# Patient Record
(demographics unavailable — no encounter records)

---

## 2024-10-29 NOTE — PLAN
[TextEntry] : - Thyroid nodule on recent CT neck. No associated symptoms but patient feels like it has been growing. - US in the office today showed a complex cyst with a solid component. 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 Recommended DL and biopsy of the VC and will request a USFNA of the thyroid after the VC biopsy. - Risks and complications of the procedure discussed with the patient today.

## 2024-10-29 NOTE — HISTORY OF PRESENT ILLNESS
[de-identified] : This is a 65 yro patient with HTN, DM, COPD, bronchitis, referred by pulmonologist Dr. Jonh Lowery for thyroid nodule.  This was found about 6 years ago during physical exam/US. Per pt, she had about 3 negative biopsies many yrs ago ([id number]) with an ENT, she does not remember the name.  CT chest done for lung cancer screening 11/2023 showed a 2.6cm R thyroid nodule.  No recent US thyroid.  No dysphagia, odynophonia or pressure in the throat.  Pt with raspy voice which comes and goes but getting worse in the last 5 years.   Patient denies h/o radiation and has no family h/o thyroid cancer.  Not on any thyroid medications. Smoked 1ppd for 45 yrs. Has cut back to currently 5-7cig/day. Pt will be scheduling new CT chest in Nov 2024.    CT chest w/o contrast 11/20/2023 IMPRESSION: Interval resolution of bilateral nodular opacities within the upper lungs. Emphysema. Recommend annual screening with low-dose chest CT in 12 months if the patient continues to meet eligibility criteria. 2.6 cm right thyroid nodule. Recommend further evaluation with ultrasound if not previously performed.

## 2024-10-29 NOTE — HISTORY OF PRESENT ILLNESS
[de-identified] : This is a 65 yro patient with HTN, DM, COPD, bronchitis, referred by pulmonologist Dr. Jonh Lowery for thyroid nodule.  This was found about 6 years ago during physical exam/US. Per pt, she had about 3 negative biopsies many yrs ago ([id number]) with an ENT, she does not remember the name.  CT chest done for lung cancer screening 11/2023 showed a 2.6cm R thyroid nodule.  No recent US thyroid.  No dysphagia, odynophonia or pressure in the throat.  Pt with raspy voice which comes and goes but getting worse in the last 5 years.   Patient denies h/o radiation and has no family h/o thyroid cancer.  Not on any thyroid medications. Smoked 1ppd for 45 yrs. Has cut back to currently 5-7cig/day. Pt will be scheduling new CT chest in Nov 2024.    CT chest w/o contrast 11/20/2023 IMPRESSION: Interval resolution of bilateral nodular opacities within the upper lungs. Emphysema. Recommend annual screening with low-dose chest CT in 12 months if the patient continues to meet eligibility criteria. 2.6 cm right thyroid nodule. Recommend further evaluation with ultrasound if not previously performed.

## 2024-11-27 NOTE — HISTORY OF PRESENT ILLNESS
[Current] : Current [TextBox_13] : Patient is scheduled for a annual  LDCT for lung cancer screening.  Chart review performed to confirm eligibility for LDCT.    No documented personal or family history of lung cancer. No documented s/s of lung cancer. Patient is a current smoker with a 40 pack year hx. [PacksperYear] : 40

## 2024-12-12 NOTE — HISTORY OF PRESENT ILLNESS
[de-identified] : This is a 65 yro patient with HTN, DM, COPD, bronchitis, was referred by pulmonologist Dr. 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  Per pt, she had about 3 negative thyroid biopsies many yrs ago ([id number]) with an ENT, she does not remember the name. CT chest done for lung cancer screening 11/2023 showed a 2.6cm R thyroid nodule.  Pt had raspy voice which comes and goes but was getting worse in the last 5 years.   Here today for follow-up s/p Direct laryngoscopy with use of telescope and biopsy of the left vocal cord lesion on 11/11/2024. Today pt reports voice is raspier than it was before procedure. States she feels lump in R neck/thyroid is larger. She feels something in R side of throat when swallowing.  No dysphagia, odynophonia, completes loss of voice, hemoptysis, fever, chills, or weight loss. Eating and drinking without issues.  Pt still smoking about 5-6 cigarettes/ day.   Left vocal cord biopsy on 11/11/24: Final Diagnosis 1. Larynx, left vocal cord lesion, biopsy - Polypoid laryngeal tissue with squamous hyperplasia, focal papillary hyperplasia, hyperkeratosis, and focal low-grade dysplasia  
[de-identified] : This is a 65 yro patient with HTN, DM, COPD, bronchitis, was referred by pulmonologist Dr. 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  Per pt, she had about 3 negative thyroid biopsies many yrs ago ([id number]) with an ENT, she does not remember the name. CT chest done for lung cancer screening 11/2023 showed a 2.6cm R thyroid nodule.  Pt had raspy voice which comes and goes but was getting worse in the last 5 years.   Here today for follow-up s/p Direct laryngoscopy with use of telescope and biopsy of the left vocal cord lesion on 11/11/2024. Today pt reports voice is raspier than it was before procedure. States she feels lump in R neck/thyroid is larger. She feels something in R side of throat when swallowing.  No dysphagia, odynophonia, completes loss of voice, hemoptysis, fever, chills, or weight loss. Eating and drinking without issues.  Pt still smoking about 5-6 cigarettes/ day.   Left vocal cord biopsy on 11/11/24: Final Diagnosis 1. Larynx, left vocal cord lesion, biopsy - Polypoid laryngeal tissue with squamous hyperplasia, focal papillary hyperplasia, hyperkeratosis, and focal low-grade dysplasia  
Reviewed results with patient.  Patient aware to follow up with OB/GYN on Monday as scheduled. Patient states she is feeling much better after IVF and antiemetic. Return precautions given. Patient agreeable to discharge.

## 2024-12-12 NOTE — PLAN
[TextEntry] : - Thyroid nodule on recent CT neck. No associated symptoms but patient feels like it has been growing. - US in the office today showed a complex cyst with a solid component. 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 Recommended DL and biopsy of the VC and will request a USFNA of the thyroid after the VC biopsy. - Procedure done on 11/11/24. Result was benign. Results discussed with the patient today. - No lesions on scope today. - Return in 6 months.

## 2024-12-12 NOTE — REASON FOR VISIT
[Subsequent Evaluation] : a subsequent evaluation for [Post-Operative Visit] : a post-operative visit [FreeTextEntry2] : s/p Direct laryngoscopy with use of telescope and biopsy of the left vocal cord lesion on 11/11/2024

## 2025-01-16 NOTE — HISTORY OF PRESENT ILLNESS
[Current] : current [>= 20 pack years] : >= 20 pack years [Lung Cancer Screening] : Patient underwent lung cancer screening [0] : 0 [2] : 2 [TextBox_4] : ~age~yo woman current >40py smoker w/ PMH COPD on nocturnal O2 w/ frequent exacerbations presenting to re-establish pulmonary follow-up.  Previous pt of Dr. Steel (last seen 8/2024. Last hospitalization for AECOPD 12/2023. Doing very well in the interim. Did not like Trelegy and didn't feel it covered her symptoms for the full 24hrs -- her PMD switched her to Incruse + Dulera which has been working better for her in maintaining symptom relief throughout the day. Also tolerating Daliresp 500mcg daily. Doesn't need ventolin very often. Using O2 at night consistently; not needing during daytime. Denies wheezing but has been coughing a bit more recently that she attributes to the seasonal change with allergies. Says she is due for annual LDCT lung CA screening. No interval ED/UC visits for COPD nor need for oral steroid.   SH: Current smoker, >40py; now 5-6cig per day - not ready to quit  [1/16/25] Following up and post-discharge from [facility] hospitalization on 1/3-1/8/25 for AECOPD suspected 2/2 viral URI. Between last visit had also switched back from triple therapy w/ Breztri to Symbicort + Incruse. Saw ENT Dr. Encinas for eval for thyroid nodule and also workup for dysphonia w/ laryngoscopy and VC bx latter of which was benign. Today she is feeling much better from the hospital. Still completing slow prednisone taper, on 20mg about to go down to 10mg to finish this weekend. Still has bothersome PND with throat clearing; using mucinex still and got refill from her PMD yesterday. No wheezing or tightness since hospital discharge and thinks she's back to her baseline level of FINNEY. Cough also better. Using LABA/ICS+LAMA as directed. Needs albuterol nebs refilled.  [TextBox_11] : 1 [TextBox_13] : >40 [TextBox_12] : 04/2022 [TextBox_27] : 04/2023 [TextBox_42] : 12/2024 [TextBox_52] : 5

## 2025-01-16 NOTE — REVIEW OF SYSTEMS
[SOB on Exertion] : sob on exertion [Negative] : Endocrine [Postnasal Drip] : postnasal drip [Cough] : no cough [Sputum] : no sputum [Wheezing] : no wheezing

## 2025-01-16 NOTE — PHYSICAL EXAM
[No Acute Distress] : no acute distress [Normal Oropharynx] : normal oropharynx [Normal Appearance] : normal appearance [Normal Rate/Rhythm] : normal rate/rhythm [Normal S1, S2] : normal s1, s2 [No Resp Distress] : no resp distress [Normal Rhythm and Effort] : normal rhythm and effort [No Abnormalities] : no abnormalities [Normal Gait] : normal gait [No Clubbing] : no clubbing [No Edema] : no edema [Normal Color/ Pigmentation] : normal color/ pigmentation [No Focal Deficits] : no focal deficits [Oriented x3] : oriented x3 [Normal Affect] : normal affect [TextBox_68] : single late and soft end-exp wheeze right upper posterior field; remaining fields CTA though diminished overall throughout [Clear to Auscultation Bilaterally] : clear to auscultation bilaterally [TextBox_44] : right neck palpable nodule, consistent with known ?thyroid nodule

## 2025-01-16 NOTE — DISCUSSION/SUMMARY
[FreeTextEntry1] : CT Chest w/o (PACS, 12/2023) - resolved bilateral nodular opacities in upper lobers, emphysema  PFT (9/2023) - mod/severe restr, decr DLCO; poor quality study

## 2025-01-16 NOTE — HISTORY OF PRESENT ILLNESS
[Current] : current [>= 20 pack years] : >= 20 pack years [Lung Cancer Screening] : Patient underwent lung cancer screening [0] : 0 [2] : 2 [TextBox_4] : ~age~yo woman current >40py smoker w/ PMH COPD on nocturnal O2 w/ frequent exacerbations presenting to re-establish pulmonary follow-up.  Previous pt of Dr. Steel (last seen 8/2024. Last hospitalization for AECOPD 12/2023. Doing very well in the interim. Did not like Trelegy and didn't feel it covered her symptoms for the full 24hrs -- her PMD switched her to Incruse + Dulera which has been working better for her in maintaining symptom relief throughout the day. Also tolerating Daliresp 500mcg daily. Doesn't need ventolin very often. Using O2 at night consistently; not needing during daytime. Denies wheezing but has been coughing a bit more recently that she attributes to the seasonal change with allergies. Says she is due for annual LDCT lung CA screening. No interval ED/UC visits for COPD nor need for oral steroid.   SH: Current smoker, >40py; now 5-6cig per day - not ready to quit  [1/16/25] Following up and post-discharge from [facility] hospitalization on 1/3-1/8/25 for AECOPD suspected 2/2 viral URI. Between last visit had also switched back from triple therapy w/ Breztri to Symbicort + Incruse. Saw ENT Dr. Encinas for eval for thyroid nodule and also workup for dysphonia w/ laryngoscopy and VC bx latter of which was benign. Today she is feeling much better from the hospital. Still completing slow prednisone taper, on 20mg about to go down to 10mg to finish this weekend. Still has bothersome PND with throat clearing; using mucinex still and got refill from her PMD yesterday. No wheezing or tightness since hospital discharge and thinks she's back to her baseline level of FINNEY. Cough also better. Using LABA/ICS+LAMA as directed. Needs albuterol nebs refilled.  [TextBox_11] : 1 [TextBox_13] : >40 [TextBox_12] : 04/2022 [TextBox_27] : 04/2023 [TextBox_42] : 12/2024 [TextBox_52] : 5

## 2025-01-16 NOTE — ASSESSMENT
[FreeTextEntry1] : ~age~yo woman current >40py smoker w/ PMH asthma/COPD overlap on nocturnal O2 w/ frequent exacerbations following up for COPD management; last exacerbation 1/2025.  Data Reviewed: LDCT Chest (PACS, 12/2/24) - LUNG-RADS 2, no new nodules; unchanged RLL perifissural 0.5cm nodule and 0.4cm nodule; emphysema PFT (10/2024) - poor quality study ?moderate/severe obstruction; mod-severe DLCO; could not adequately measure pleth for volumes 6MWT (10/2024) - 60m (terminated after 3min said she was too tired to continue), pre HR/SpO2 95/92%, post 110/89%  Impression: #COPD, GOLD E, last exacerbation 1/3/25 #Chronic hypoxemic respiratory failure #Pulmonary nodules #Current smoker - not ready to quit but willing to try cutting down  Plan: - cont albuterol MDI 1-2 puffs vs. nebulizer q4-6h PRN; rx renewed - cont Symbicort 160/4.5 2 puffs BID with mouth rinsing - cont Incruse 1 puff daily; had tried to consolidate to Trelegy (not covered) and did not feel Breztri was as effective - cont Daliresp 500mcg daily; rx renewed - will try and get ins approval to start PDE3/4i Ohtuvayre nebs BID; discussed new/novel med at length as add-on to mod-severe COPD pts; also reviewed appropriate usage and safety profile/common ADRs  - UTD on PFT/6MWT as above; poor overall quality study however - UTD on LDCT as above, LUNG-RADS 2 nodules - can cont nocturnal O2 for now - needs to stop smoking, have discussed several times - she is not yet ready to quit - also briefly discussed Dupixent as possible add-on therapy but will try Ohtuvayre first - pt has declined PSG testing for STARLA and pulm rehab   RTO 3mos, or sooner if needed

## 2025-03-25 NOTE — SURGICAL HISTORY
Please inform ct is showing no significant parathyroid abnormalities seen, incidental note of thyroid nodule present which is consistent with finding on us thyroid keep follow up as planned to further review and discuss    Study Result     PROCEDURE:  CT S parenchymal scarring in the lung apices with ground-glass reticular infiltrate left upper lobe anteriorly likely related to post radiation changes. CONCLUSION:  1.3 cm nodule arising from the inferior pole of the right lobe of the thyroid gland.     Ther [PSH Reviewed and Updated] : past surgical history reviewed and updated

## 2025-03-26 NOTE — REASON FOR VISIT
[Initial Evaluation] : an initial evaluation [FreeTextEntry1] : >40py smoker w/ PMH asthma/COPD overlap on nocturnal O2 w/ frequent exacerbations following up for COPD management; last exacerbation 1/2025.

## 2025-03-26 NOTE — HISTORY OF PRESENT ILLNESS
[Family Member] : family member [Formal Caregiver] : formal caregiver [[facility] Calls Co-Management Patient] : [unfilled] is a [facility] Calls co-management patient [Education provided] : education provided [LastPCPVisitDate] : 3/19/25 [FreeTextEntry1] : >40py smoker w/ PMH asthma/COPD overlap on nocturnal O2 w/ frequent exacerbations following up for COPD management; last exacerbation 1/2025. [FreeTextEntry2] :  GLORIA JOHNSTON is being seen for a visit provided via telehealth real-time audio visual technology.  GLORIA JOHNSTON was located in their home, [address] [place] [address], at the time of the visit.  The house calls clinician, RAMU RIVERO, was located remotely at their home in [place] at the time of the visit.  The patient, GLORIA JOHNSTON and the house calls clinician RAMU RIVERO, participated in the telehealth encounter. other participants included:  GLORIA JOHNSTON (Jul 11 1959) or his/her representative consents to the use of telehealth.  All questions related to telehealth answered.  64 yo woman current >40py smoker w/ PMH asthma/COPD overlap on nocturnal O2 w/ frequent exacerbations following up for COPD management; last exacerbation 1/2025.  Data Reviewed: LDCT Chest (PACS, 12/2/24) - LUNG-RADS 2, no new nodules; unchanged RLL perifissural 0.5cm nodule and 0.4cm nodule; emphysema PFT (10/2024) - poor quality study ?moderate/severe obstruction; mod-severe DLCO; could not adequately measure pleth for volumes 6MWT (10/2024) - 60m (terminated after 3min said she was too tired to continue), pre HR/SpO2 95/92%, post 110/89%  Patient/ patient's caregiver reports no weight loss >10lbs in the past 6 months. No changes in dentition or swallowing reported, No changes in hearing or vision reported. No changes in Cognition reported. Patient denies any symptoms of depression or anxiety. Patient is ADL independent/dependent and IADL dependent. No changes in gait or falls reported.  Patient's home environment is safe.  .

## 2025-03-26 NOTE — CHRONIC CARE ASSESSMENT
[Limited decision making ability] : limited decision making ability [PPS Score: ____] : Palliative Performance Scale (PPS) Score: [unfilled] [de-identified] : as tolerated [de-identified] : low sodium

## 2025-03-26 NOTE — COUNSELING
[Normal Weight - ( BMI  <25 )] : normal weight - ( BMI  <25 ) [Hypertension self management education material provided] : hypertension self management education material provided [Non - Smoker] : non-smoker [Use assistive device to avoid falls] : use assistive device to avoid falls [] : foot exam [Patient not on disease-modifying anti-rheumatic drug due to overall prognosis] : Patient not on disease-modifying anti-rheumatic drug due to overall prognosis [Not Recommended] : Aspirin use not recommended due to overall prognosis [Decrease hospital use] : decrease hospital use [FreeTextEntry4] : Educated patient/legal representative about CCM services and consent. Educated patient/legal representative that this service is available because they have 2 or more chronic conditions, that only one Provider can furnish CCM Services per calendar month and that CCM services are subject to the usual Medicare deductible and coinsurance.  Patient/legal representative understands the right to stop the CCM services at any time by notifying [facility] Calls office. Patient/legal representative has verbally consented (3/2025)  [ - New patient with 2 or more chronic conditions; CCM discussed and patient-centered care plan established] : New patient with 2 or more chronic conditions; CCM discussed and patient-centered care plan established

## 2025-03-26 NOTE — HISTORY OF PRESENT ILLNESS
[Family Member] : family member [Formal Caregiver] : formal caregiver [House Calls Co-Management Patient] : [unfilled] is a House Calls co-management patient [Education provided] : education provided [LastPCPVisitDate] : 3/19/25 [FreeTextEntry1] : >40py smoker w/ PMH asthma/COPD overlap on nocturnal O2 w/ frequent exacerbations following up for COPD management; last exacerbation 1/2025. [FreeTextEntry2] :  GLORIA JOHNSTON is being seen for a visit provided via telehealth real-time audio visual technology.  GLORIA JOHNSTON was located in their home, [address] [place] [address], at the time of the visit.  The house calls clinician, RAMU RIVERO, was located remotely at their home in [place] at the time of the visit.  The patient, GLORIA JOHNSTON and the house calls clinician RAMU RIVERO, participated in the telehealth encounter. other participants included:  GLORIA JOHNSTON (Jul 11 1959) or his/her representative consents to the use of telehealth.  All questions related to telehealth answered.  64 yo woman current >40py smoker w/ PMH asthma/COPD overlap on nocturnal O2 w/ frequent exacerbations following up for COPD management; last exacerbation 1/2025.  Data Reviewed: LDCT Chest (PACS, 12/2/24) - LUNG-RADS 2, no new nodules; unchanged RLL perifissural 0.5cm nodule and 0.4cm nodule; emphysema PFT (10/2024) - poor quality study ?moderate/severe obstruction; mod-severe DLCO; could not adequately measure pleth for volumes 6MWT (10/2024) - 60m (terminated after 3min said she was too tired to continue), pre HR/SpO2 95/92%, post 110/89%  Patient/ patient's caregiver reports no weight loss >10lbs in the past 6 months. No changes in dentition or swallowing reported, No changes in hearing or vision reported. No changes in Cognition reported. Patient denies any symptoms of depression or anxiety. Patient is ADL independent/dependent and IADL dependent. No changes in gait or falls reported.  Patient's home environment is safe.  .

## 2025-03-26 NOTE — CHRONIC CARE ASSESSMENT
[Limited decision making ability] : limited decision making ability [PPS Score: ____] : Palliative Performance Scale (PPS) Score: [unfilled] [de-identified] : as tolerated [de-identified] : low sodium

## 2025-04-15 NOTE — PHYSICAL EXAM
[No Acute Distress] : no acute distress [Normal Oropharynx] : normal oropharynx [Normal Appearance] : normal appearance [Normal Rate/Rhythm] : normal rate/rhythm [Normal S1, S2] : normal s1, s2 [No Resp Distress] : no resp distress [Normal Rhythm and Effort] : normal rhythm and effort [Clear to Auscultation Bilaterally] : clear to auscultation bilaterally [No Abnormalities] : no abnormalities [Normal Gait] : normal gait [No Clubbing] : no clubbing [No Edema] : no edema [Normal Color/ Pigmentation] : normal color/ pigmentation [No Focal Deficits] : no focal deficits [Oriented x3] : oriented x3 [Normal Affect] : normal affect [TextBox_44] : right neck palpable nodule, consistent with known ?thyroid nodule

## 2025-04-17 NOTE — DISCUSSION/SUMMARY
[FreeTextEntry1] : LDCT Chest (PACS, 12/2/24) - LUNG-RADS 2, no new nodules; unchanged RLL perifissural 0.5cm nodule and 0.4cm nodule; emphysema  PFT (10/2024) - poor quality study ?moderate/severe obstruction; mod-severe DLCO; could not adequately measure pleth for volumes  6MWT (10/2024) - 60m (terminated after 3min said she was too tired to continue), pre HR/SpO2 95/92%, post 110/89%  CT Chest w/o (PACS, 12/2023) - resolved bilateral nodular opacities in upper lobers, emphysema  PFT (9/2023) - mod/severe restr, decr DLCO; poor quality study

## 2025-04-17 NOTE — ASSESSMENT
[FreeTextEntry1] : ~age~yo woman current >40py smoker w/ PMH asthma/COPD overlap on nocturnal O2 w/ frequent exacerbations following up for COPD management; last exacerbation 1/2025.  Data Reviewed: Medication reconciliation  Impression: #COPD, GOLD E, last exacerbation 1/3/25 #Chronic hypoxemic respiratory failure #Pulmonary nodules - LUNG-RADS 2, in annual LDCT program  #Current smoker - not ready to quit but willing to try cutting down; still at 5-7cig/day  Plan: - cont albuterol MDI 1-2 puffs vs. nebulizer q4-6h PRN; - cont Symbicort 160/4.5 2 puffs BID with mouth rinsing; rx renewed - cont Incruse 1 puff daily; had tried to consolidate to Trelegy (not covered) and did not feel Breztri was as effective; rx renewed for Incruse - cont Daliresp 500mcg daily; rx renewed - pt did not yet start Ohtuvayre due to confusion with her nebulizer machine vs. accepting new one from pharmacy; instructed her to contact [facility] for delivery of nebulizer system to use Ohtuvayre - UTD on PFT/6MWT albeit poor overall quality study - UTD on LDCT LUNG-RADS 2 nodules - can cont nocturnal O2 for now - needs to stop smoking, have discussed several times and again counseled her today - she is not yet ready to quit - had discussed Dupixent as possible add-on therapy last visit and intended to try Ohtuvayre first -- since she has not yet started the Ohtuvayre yet will defer Dupixent consideration until next f/u pending progress - pt has declined PSG testing for STARLA and pulm rehab; again re-broached this today and still declines  RTO 3mos, or sooner if needed

## 2025-04-17 NOTE — ASSESSMENT
[FreeTextEntry1] : ~age~yo woman current >40py smoker w/ PMH asthma/COPD overlap on nocturnal O2 w/ frequent exacerbations following up for COPD management; last exacerbation 1/2025.  Data Reviewed: Medication reconciliation  Impression: #COPD, GOLD E, last exacerbation 1/3/25 #Chronic hypoxemic respiratory failure #Pulmonary nodules - LUNG-RADS 2, in annual LDCT program  #Current smoker - not ready to quit but willing to try cutting down; still at 5-7cig/day  Plan: - cont albuterol MDI 1-2 puffs vs. nebulizer q4-6h PRN; - cont Symbicort 160/4.5 2 puffs BID with mouth rinsing; rx renewed - cont Incruse 1 puff daily; had tried to consolidate to Trelegy (not covered) and did not feel Breztri was as effective; rx renewed for Incruse - cont Daliresp 500mcg daily; rx renewed - pt did not yet start Ohtuvayre due to confusion with her nebulizer machine vs. accepting new one from pharmacy; instructed her to contact [facility] for delivery of nebulizer system to use Ohtuvayre - UTD on PFT/6MWT albeit poor overall quality study - UTD on LDCT LUNG-RADS 2 nodules - can cont nocturnal O2 for now - needs to stop smoking, have discussed several times and again counseled her today - she is not yet ready to quit - had discussed Dupixent as possible add-on therapy last visit and intended to try Ohtuvayre first -- since she has not yet started the Ohtuvayre yet will defer Dupixent consideration until next f/u pending progress - pt has declined PSG testing for STARLA and pulm rehab; again re-broached this today and still declines  RTO 3mos, or sooner if needed

## 2025-04-17 NOTE — HISTORY OF PRESENT ILLNESS
[Current] : current [>= 20 pack years] : >= 20 pack years [Lung Cancer Screening] : Patient underwent lung cancer screening [0] : 0 [2] : 2 [TextBox_4] : ~age~yo woman current >40py smoker w/ PMH COPD on nocturnal O2 w/ frequent exacerbations presenting to re-establish pulmonary follow-up.  Previous pt of Dr. Steel (last seen 8/2024. Last hospitalization for AECOPD 12/2023. Doing very well in the interim. Did not like Trelegy and didn't feel it covered her symptoms for the full 24hrs -- her PMD switched her to Incruse + Dulera which has been working better for her in maintaining symptom relief throughout the day. Also tolerating Daliresp 500mcg daily. Doesn't need ventolin very often. Using O2 at night consistently; not needing during daytime. Denies wheezing but has been coughing a bit more recently that she attributes to the seasonal change with allergies. Says she is due for annual LDCT lung CA screening. No interval ED/UC visits for COPD nor need for oral steroid.   SH: Current smoker, >40py; now 5-6cig per day - not ready to quit  [1/16/25] Following up and post-discharge from [facility] hospitalization on 1/3-1/8/25 for AECOPD suspected 2/2 viral URI. Between last visit had also switched back from triple therapy w/ Breztri to Symbicort + Incruse. Saw ENT Dr. Encinas for eval for thyroid nodule and also workup for dysphonia w/ laryngoscopy and VC bx latter of which was benign. Today she is feeling much better from the hospital. Still completing slow prednisone taper, on 20mg about to go down to 10mg to finish this weekend. Still has bothersome PND with throat clearing; using mucinex still and got refill from her PMD yesterday. No wheezing or tightness since hospital discharge and thinks she's back to her baseline level of FINNEY. Cough also better. Using LABA/ICS+LAMA as directed. Needs albuterol nebs refilled.   [4/17/25] 3mo f/u. Still smoking, about 5-7 cigarettes a day. Not ready to quit. Did not start the Ohtuvayre yet because she wasn't sure if she could use it with her current nebulizer and was confused when the pharmacy told her she needed a nebulizer with a filter. Did not have a new nebulizer delivered. No specific new or worsening resp symptoms. Using inhalers and daliresp as directed; needs refills on some. No interval ED/UC visits for COPD. No new or worsening cough, no change in FINNEY. Using O2 at night unchanged. Not interested in sleep eval.  [TextBox_11] : 1 [TextBox_13] : >40 [TextBox_12] : 04/2022 [TextBox_27] : 04/2023 [TextBox_42] : 12/2024 [TextBox_52] : 5

## 2025-04-17 NOTE — HISTORY OF PRESENT ILLNESS
[Current] : current [>= 20 pack years] : >= 20 pack years [Lung Cancer Screening] : Patient underwent lung cancer screening [0] : 0 [2] : 2 [TextBox_4] : ~age~yo woman current >40py smoker w/ PMH COPD on nocturnal O2 w/ frequent exacerbations presenting to re-establish pulmonary follow-up.  Previous pt of Dr. Steel (last seen 8/2024. Last hospitalization for AECOPD 12/2023. Doing very well in the interim. Did not like Trelegy and didn't feel it covered her symptoms for the full 24hrs -- her PMD switched her to Incruse + Dulera which has been working better for her in maintaining symptom relief throughout the day. Also tolerating Daliresp 500mcg daily. Doesn't need ventolin very often. Using O2 at night consistently; not needing during daytime. Denies wheezing but has been coughing a bit more recently that she attributes to the seasonal change with allergies. Says she is due for annual LDCT lung CA screening. No interval ED/UC visits for COPD nor need for oral steroid.   SH: Current smoker, >40py; now 5-6cig per day - not ready to quit  [1/16/25] Following up and post-discharge from [facility] hospitalization on 1/3-1/8/25 for AECOPD suspected 2/2 viral URI. Between last visit had also switched back from triple therapy w/ Breztri to Symbicort + Incruse. Saw ENT Dr. Encinas for eval for thyroid nodule and also workup for dysphonia w/ laryngoscopy and VC bx latter of which was benign. Today she is feeling much better from the hospital. Still completing slow prednisone taper, on 20mg about to go down to 10mg to finish this weekend. Still has bothersome PND with throat clearing; using mucinex still and got refill from her PMD yesterday. No wheezing or tightness since hospital discharge and thinks she's back to her baseline level of FINNEY. Cough also better. Using LABA/ICS+LAMA as directed. Needs albuterol nebs refilled.   [4/17/25] 3mo f/u. Still smoking, about 5-7 cigarettes a day. Not ready to quit. Did not start the Ohtuvayre yet because she wasn't sure if she could use it with her current nebulizer and was confused when the pharmacy told her she needed a nebulizer with a filter. Did not have a new nebulizer delivered. No specific new or worsening resp symptoms. Using inhalers and daliresp as directed; needs refills on some. No interval ED/UC visits for COPD. No new or worsening cough, no change in FINNEY. Using O2 at night unchanged. Not interested in sleep eval.  [TextBox_11] : 1 [TextBox_13] : >40 [TextBox_12] : 04/2022 [TextBox_27] : 04/2023 [TextBox_42] : 12/2024 [TextBox_52] : 5

## 2025-04-17 NOTE — REVIEW OF SYSTEMS
[Postnasal Drip] : postnasal drip [SOB on Exertion] : sob on exertion [Negative] : Endocrine [Cough] : no cough [Sputum] : no sputum [Wheezing] : no wheezing

## 2025-06-19 NOTE — PLAN
[TextEntry] : - Thyroid nodule on recent CT neck. No associated symptoms but patient feels like it has been growing. - US in the office today showed a complex cyst with a solid component. 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 Recommended DL and biopsy of the VC. - Procedure done on 11/11/24. Result was benign. Leukoplakia is stable. - Patient wants to see someone to discuss smoking cessation. - Return in 62months.

## 2025-06-19 NOTE — HISTORY OF PRESENT ILLNESS
[de-identified] : This is a 65 yro patient with HTN, DM, COPD, bronchitis, was referred by pulmonologist Dr. 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  Per pt, she had about 3 negative thyroid biopsies many yrs ago ([id number]) with an ENT, she does not remember the name. CT chest done for lung cancer screening 11/2023 showed a 2.6cm R thyroid nodule.  S/p Direct laryngoscopy with use of telescope and biopsy of the left vocal cord lesion on 11/11/2024. Path benign.  Today pt reports voice is raspier than it was before procedure.  Today pt states she feels lump in R neck/thyroid is larger and growing.  She feels like food is getting stuck in the throat. Pt has consistent raspy voice which is same since last visit. She feels a "bubble" on the L neck about 6 months ago.  No choking, odynophagia, odynophonia, complete loss of voice, hemoptysis, fever, chills, or weight loss.  Pt still smoking about 5-6 cigarettes/ day.